# Patient Record
(demographics unavailable — no encounter records)

---

## 2025-04-16 NOTE — HISTORY OF PRESENT ILLNESS
[N] : Patient is not sexually active [LMPDate] : 4/2/2025 [FreeTextEntry1] : Menses are normal, started at the age of 12.

## 2025-04-16 NOTE — PHYSICAL EXAM
[No Murmurs] : no murmurs [Examination Of The Breasts] : a normal appearance [Breast Nipple Inversion] : inverted [No Masses] : no breast masses were palpable [Chaperoned Physical Exam] : A chaperone was present in the examining room during all aspects of the physical examination. [MA] : MA [Soft] : soft [Non-tender] : non-tender [Non-distended] : non-distended [FreeTextEntry1] : Deferred

## 2025-04-16 NOTE — PLAN
[FreeTextEntry1] : Discussed protection methods if sexually active in the future.  Discussed management of acne which may include OCPs.  Pt will consider but declines for now

## 2025-04-16 NOTE — SIGNATURES
[TextEntry] : This note was written by Selena Escalante on 04/10/2025 actively solely Dr. Alexis M.D.    All medical record entries made by this scribe at my, Dr. Alexis M.D direction and personally dictated by me on 04/10/2025. I have personally reviewed the chart and agree that the record reflects my personal performance of the history, physical exam, assessment, and plan.